# Patient Record
Sex: FEMALE | Race: AMERICAN INDIAN OR ALASKA NATIVE | Employment: UNEMPLOYED | URBAN - METROPOLITAN AREA
[De-identification: names, ages, dates, MRNs, and addresses within clinical notes are randomized per-mention and may not be internally consistent; named-entity substitution may affect disease eponyms.]

---

## 2020-09-20 ENCOUNTER — HOSPITAL ENCOUNTER (EMERGENCY)
Facility: CLINIC | Age: 40
Discharge: HOME | End: 2020-09-20
Attending: EMERGENCY MEDICINE | Admitting: EMERGENCY MEDICINE

## 2020-09-20 VITALS
SYSTOLIC BLOOD PRESSURE: 114 MMHG | WEIGHT: 150 LBS | HEIGHT: 66 IN | BODY MASS INDEX: 24.11 KG/M2 | RESPIRATION RATE: 16 BRPM | OXYGEN SATURATION: 98 % | TEMPERATURE: 97.2 F | DIASTOLIC BLOOD PRESSURE: 72 MMHG

## 2020-09-20 DIAGNOSIS — F41.9 ANXIETY: ICD-10-CM

## 2020-09-20 DIAGNOSIS — F10.920 ALCOHOLIC INTOXICATION WITHOUT COMPLICATION (H): ICD-10-CM

## 2020-09-20 LAB
ANION GAP SERPL CALCULATED.3IONS-SCNC: 10 MMOL/L (ref 3–14)
BASOPHILS # BLD AUTO: 0 10E9/L (ref 0–0.2)
BASOPHILS NFR BLD AUTO: 0.2 %
BUN SERPL-MCNC: 5 MG/DL (ref 7–30)
CALCIUM SERPL-MCNC: 7.9 MG/DL (ref 8.5–10.1)
CHLORIDE SERPL-SCNC: 108 MMOL/L (ref 94–109)
CO2 SERPL-SCNC: 22 MMOL/L (ref 20–32)
CREAT SERPL-MCNC: 0.55 MG/DL (ref 0.52–1.04)
DIFFERENTIAL METHOD BLD: ABNORMAL
EOSINOPHIL # BLD AUTO: 0.1 10E9/L (ref 0–0.7)
EOSINOPHIL NFR BLD AUTO: 0.9 %
ERYTHROCYTE [DISTWIDTH] IN BLOOD BY AUTOMATED COUNT: 16.7 % (ref 10–15)
ETHANOL SERPL-MCNC: 0.22 G/DL
GFR SERPL CREATININE-BSD FRML MDRD: >90 ML/MIN/{1.73_M2}
GLUCOSE SERPL-MCNC: 250 MG/DL (ref 70–99)
HCT VFR BLD AUTO: 38.8 % (ref 35–47)
HGB BLD-MCNC: 12.2 G/DL (ref 11.7–15.7)
IMM GRANULOCYTES # BLD: 0 10E9/L (ref 0–0.4)
IMM GRANULOCYTES NFR BLD: 0.3 %
INTERPRETATION ECG - MUSE: NORMAL
LYMPHOCYTES # BLD AUTO: 1.5 10E9/L (ref 0.8–5.3)
LYMPHOCYTES NFR BLD AUTO: 11.8 %
MCH RBC QN AUTO: 21.9 PG (ref 26.5–33)
MCHC RBC AUTO-ENTMCNC: 31.4 G/DL (ref 31.5–36.5)
MCV RBC AUTO: 70 FL (ref 78–100)
MONOCYTES # BLD AUTO: 0.9 10E9/L (ref 0–1.3)
MONOCYTES NFR BLD AUTO: 7.3 %
NEUTROPHILS # BLD AUTO: 9.7 10E9/L (ref 1.6–8.3)
NEUTROPHILS NFR BLD AUTO: 79.5 %
NRBC # BLD AUTO: 0 10*3/UL
NRBC BLD AUTO-RTO: 0 /100
PLATELET # BLD AUTO: 312 10E9/L (ref 150–450)
POTASSIUM SERPL-SCNC: 3.5 MMOL/L (ref 3.4–5.3)
RBC # BLD AUTO: 5.58 10E12/L (ref 3.8–5.2)
SODIUM SERPL-SCNC: 140 MMOL/L (ref 133–144)
TROPONIN I SERPL-MCNC: <0.015 UG/L (ref 0–0.04)
WBC # BLD AUTO: 12.2 10E9/L (ref 4–11)

## 2020-09-20 PROCEDURE — 85025 COMPLETE CBC W/AUTO DIFF WBC: CPT | Performed by: EMERGENCY MEDICINE

## 2020-09-20 PROCEDURE — 93005 ELECTROCARDIOGRAM TRACING: CPT

## 2020-09-20 PROCEDURE — 80320 DRUG SCREEN QUANTALCOHOLS: CPT | Performed by: EMERGENCY MEDICINE

## 2020-09-20 PROCEDURE — 99285 EMERGENCY DEPT VISIT HI MDM: CPT

## 2020-09-20 PROCEDURE — 84484 ASSAY OF TROPONIN QUANT: CPT | Performed by: EMERGENCY MEDICINE

## 2020-09-20 PROCEDURE — 80048 BASIC METABOLIC PNL TOTAL CA: CPT | Performed by: EMERGENCY MEDICINE

## 2020-09-20 RX ORDER — MULTIPLE VITAMINS W/ MINERALS TAB 9MG-400MCG
1 TAB ORAL DAILY
COMMUNITY

## 2020-09-20 RX ORDER — LACTOBACILLUS RHAMNOSUS GG 10B CELL
1 CAPSULE ORAL DAILY
COMMUNITY

## 2020-09-20 RX ORDER — HYDROXYZINE HYDROCHLORIDE 10 MG/1
10 TABLET, FILM COATED ORAL 3 TIMES DAILY PRN
COMMUNITY

## 2020-09-20 RX ORDER — SERTRALINE HYDROCHLORIDE 100 MG/1
100 TABLET, FILM COATED ORAL DAILY
COMMUNITY
End: 2020-09-20

## 2020-09-20 RX ORDER — GABAPENTIN 300 MG/1
300 CAPSULE ORAL 3 TIMES DAILY
COMMUNITY

## 2020-09-20 RX ORDER — GABAPENTIN 100 MG/1
100 CAPSULE ORAL 3 TIMES DAILY
COMMUNITY
End: 2020-09-20

## 2020-09-20 ASSESSMENT — ENCOUNTER SYMPTOMS: NERVOUS/ANXIOUS: 1

## 2020-09-20 ASSESSMENT — MIFFLIN-ST. JEOR: SCORE: 1367.15

## 2020-09-20 NOTE — ED AVS SNAPSHOT
Emergency Department  [address] [facility] [address]  Phone:  [phone]  Fax:  [phone]                                    Ms. Tommy Polo   MRN: [id number]    Department:   Emergency Department   Date of Visit:  9/20/2020           After Visit Summary Signature Page    I have received my discharge instructions, and my questions have been answered. I have discussed any challenges I see with this plan with the nurse or doctor.    ..........................................................................................................................................  Patient/Patient Representative Signature      ..........................................................................................................................................  Patient Representative Print Name and Relationship to Patient    ..................................................               ................................................  Date                                   Time    ..........................................................................................................................................  Reviewed by Signature/Title    ...................................................              ..............................................  Date                                               Time          22EPIC Rev 08/18

## 2020-09-20 NOTE — PHARMACY-ADMISSION MEDICATION HISTORY
Pharmacy Medication History  Admission medication history interview status for the 9/20/2020  admission is complete. See EPIC admission navigator for prior to admission medications     Medication history sources: Patient  Medication history source reliability: Poor  Adherence assessment: Moderate    Significant changes made to the medication list:  Added list. Pt knows the names of meds that she takes, but not sure on doses. Sertraline is blue tablet she states, which is 50mg. She was fairly sure of gabapentin dose. Didn't know atarax dose. Couldn't tell me which pharmacy or MD office to call.      Additional medication history information:   none    Medication reconciliation completed by provider prior to medication history? No    Time spent in this activity: 15 minutes      Prior to Admission medications    Medication Sig Last Dose Taking? Auth Provider   gabapentin (NEURONTIN) 300 MG capsule Take 300 mg by mouth 3 times daily 9/20/2020 at Unknown time Yes Unknown, Entered By History   hydrOXYzine (ATARAX) 10 MG tablet Take 10 mg by mouth 3 times daily as needed for itching 9/20/2020 at Unknown time Yes Reported, Patient   lactobacillus rhamnosus, GG, (CULTURELL) capsule Take 1 capsule by mouth daily 9/20/2020 at Unknown time Yes Unknown, Entered By History   multivitamin w/minerals (THERA-VIT-M) tablet Take 1 tablet by mouth daily 9/20/2020 at Unknown time Yes Unknown, Entered By History   sertraline (ZOLOFT) 50 MG tablet Take 50 mg by mouth daily 9/20/2020 at Unknown time Yes Unknown, Entered By History

## 2020-09-20 NOTE — ED PROVIDER NOTES
"  History     Chief Complaint:  Anxiety    HPI   Aurlia Washington is a 40 year old female who presents with anxiety. The patient was at the airport and missed her flight, resulting in the onset of anxiety. Staff and EMS were called to calm her down, however, the patient was unable to calm down. She was given 2.5 mg Versed IM. At this time, the patient experiences slight chest discomfort. It is noted the patient lives in [place], [place].    Cardiac Risk Factors:  The patient denies a history of diabetes, hypertension, high cholesterol, known cardiac disease or current smoking.    Allergies:  No known drug allergies    Medications:    Zoloft     Past Medical History:    Anxiety    Past Surgical History:     x2    Family History:    History reviewed. No pertinent family history.     Social History:  Smoking status: N/A  Alcohol use: Uses alcohol  Drug use: N/A    Review of Systems   Cardiovascular: Positive for chest pain.   Psychiatric/Behavioral: The patient is nervous/anxious.    All other systems reviewed and are negative.    Physical Exam     Patient Vitals for the past 24 hrs:   BP Temp Temp src Resp SpO2 Height Weight   20 1222 114/72 97.2  F (36.2  C) Oral 16 98 % 1.676 m (5' 6\") 68 kg (150 lb)       Physical Exam  Physical Exam   General:  Sitting on bed by self, but slurring words and slow to respond, intoxicated appearing.   HENT:  No obvious trauma to head  Right Ear:  External ear normal.   Left Ear:  External ear normal.   Nose:  Nose normal.   Eyes:  Conjunctivae and EOM are normal. Pupils are equal, round, and reactive.   Neck: Normal range of motion. Neck supple. No tracheal deviation present.   CV:  Normal heart sounds. No murmur heard.  Pulm/Chest: Effort normal and breath sounds normal.   Abd: Soft. No distension. There is no tenderness. There is no rigidity, no rebound and no guarding.   M/S: Normal range of motion.   Neuro: Alert. GCS 15.  No facial droop.  Moving all " "extremities.  Skin: Skin is warm and dry. No rash noted. Not diaphoretic.   Psych: Unable to assess, intoxicated appearing       Emergency Department Course   ECG (12:42:13):  Rate 80 bpm. VT interval 132. QRS duration 92. QT/QTc 414/477. P-R-T axes 60 77 75. Normal sinus rhythm. Normal ECG. Interpreted at 1242 by Dakota Frank DO.    Laboratory:  CBC: WBC 12.2 (H), o/w WNL (HGB 12.2, )  BMP: Glucose 250 (H), Urea 5 (L), Calcium 7.9 (L), o/w WNL (Creatinine 0.55)  1257 Troponin I <0.015  Ethanol: 0.22 (H)    Emergency Department Course:  The patient arrived in the emergency department via EMS.    Past medical records, nursing notes, and vitals reviewed.  1230: I performed an exam of the patient and obtained history, as documented above.    IV was inserted and blood was drawn for laboratory testing, results above.    Care of the patient signed out to Dr. Elvia Bui    Impression & Plan    Medical Decision Making:  Tommy Polo is a very pleasant 40 year old year old patient who presents to the emergency department with concern of anxiety.  The patient was to catch a flight back to [place] today.  She missed her flight and became very anxious and upset.  Staff at the airport tried to redirect the patient.  She started to escalate so EMS was called.  The patient continued to escalate.  She required 2.5 mg of Versed IM to calm down.  Here the patient is somewhat sedated.  She admits to drinking alcohol, but \"not since this morning.\"  The patient initially denied any concern but then stated she had slight chest pain.  A screening EKG and labs were performed that were unremarkable.  She has no cardiac risk factors.  She has no risk factors for pulmonary embolism.  She is not short of breath or hypoxic.  The patient's blood alcohol is 0.22.  This along with having received the Versed is likely contributory towards her current presentation.  The patient will continue to metabolize the alcohol and " Versed and care the patient is signed out to Dr. Elvia Bui until which time she is sober to be reevaluated and likely discharged.  The patient is not suicidal and had no other medical concerns she wants addressed.    Diagnosis:    ICD-10-CM    1. Alcoholic intoxication without complication (H)  F10.920 Troponin I   2. Anxiety  F41.9        Disposition:  Care of patient signed out to Dr. Elvia Bui    Discharge Medications:  New Prescriptions    No medications on file         Scribe Disclosure:  I, Chinedu Posey, am serving as a scribe at 12:47 PM on 9/20/2020 to document services personally performed by Dakota Frank DO based on my observations and the provider's statements to me.     EMERGENCY DEPARTMENT       Dakota Frank DO  09/20/20 [id number]

## 2020-09-20 NOTE — ED TRIAGE NOTES
Pt lives in [place] and missed her flight and became anxious. EMS unable to calm her down. Versed 2.5mg IM given. Pt C/O shoulder pain and body tightness.

## 2020-09-20 NOTE — ED NOTES
Pt request to be discharged. Dr Frank aware. VO to placed patient on a [facility] for further evaluation. He will talked to patient with plan of care.

## 2020-09-20 NOTE — ED NOTES
Bed: ED18  Expected date:   Expected time:   Means of arrival:   Comments:  sumi here now   Panic attack

## 2020-09-20 NOTE — ED NOTES
Patient received in signout.  Briefly, this is a 40-year-old female with history of anxiety who presents with alcohol intoxication and agitation after she missed a flight to [place] today.  She required sedation with Versed, which she has been metabolizing since arriving the ED.  She has been trying to reschedule her flight.  Plan to observe until sober and ambulatory without assistance, then discharge to that she can get her flight back to [place].      Patient sobered appropriately in the ED, and was cleared for discharge.     Elvia Bui MD  09/21/20 [id number]